# Patient Record
Sex: MALE | Race: WHITE | NOT HISPANIC OR LATINO | Employment: OTHER | URBAN - METROPOLITAN AREA
[De-identification: names, ages, dates, MRNs, and addresses within clinical notes are randomized per-mention and may not be internally consistent; named-entity substitution may affect disease eponyms.]

---

## 2017-07-19 ENCOUNTER — COMMUNICATION - HEALTHEAST (OUTPATIENT)
Dept: INTERNAL MEDICINE | Facility: CLINIC | Age: 62
End: 2017-07-19

## 2017-08-13 ENCOUNTER — AMBULATORY - HEALTHEAST (OUTPATIENT)
Dept: INTERNAL MEDICINE | Facility: CLINIC | Age: 62
End: 2017-08-13

## 2017-08-13 DIAGNOSIS — Z01.818 PREOPERATIVE EXAMINATION: ICD-10-CM

## 2017-08-14 ENCOUNTER — OFFICE VISIT - HEALTHEAST (OUTPATIENT)
Dept: INTERNAL MEDICINE | Facility: CLINIC | Age: 62
End: 2017-08-14

## 2017-08-14 DIAGNOSIS — G43.909 MIGRAINE HEADACHE: ICD-10-CM

## 2017-08-14 DIAGNOSIS — F34.1 DYSTHYMIC DISORDER: ICD-10-CM

## 2017-08-14 DIAGNOSIS — Z01.818 PREOPERATIVE EXAMINATION: ICD-10-CM

## 2017-08-14 DIAGNOSIS — E78.5 HYPERLIPIDEMIA: ICD-10-CM

## 2017-08-14 DIAGNOSIS — K21.00 REFLUX ESOPHAGITIS: ICD-10-CM

## 2017-08-14 DIAGNOSIS — K63.5 HYPERPLASTIC POLYP OF LARGE INTESTINE: ICD-10-CM

## 2017-08-14 ASSESSMENT — MIFFLIN-ST. JEOR: SCORE: 1708.65

## 2018-01-03 ENCOUNTER — OFFICE VISIT - HEALTHEAST (OUTPATIENT)
Dept: INTERNAL MEDICINE | Facility: CLINIC | Age: 63
End: 2018-01-03

## 2018-01-03 DIAGNOSIS — Z80.42 FAMILY HISTORY OF PROSTATE CANCER: ICD-10-CM

## 2018-01-03 DIAGNOSIS — R53.83 FATIGUE, UNSPECIFIED TYPE: ICD-10-CM

## 2018-01-03 DIAGNOSIS — Z00.00 HEALTH MAINTENANCE EXAMINATION: ICD-10-CM

## 2018-01-03 LAB
ALBUMIN SERPL-MCNC: 3.8 G/DL (ref 3.5–5)
ALP SERPL-CCNC: 99 U/L (ref 45–120)
ALT SERPL W P-5'-P-CCNC: 23 U/L (ref 0–45)
ANION GAP SERPL CALCULATED.3IONS-SCNC: 9 MMOL/L (ref 5–18)
AST SERPL W P-5'-P-CCNC: 22 U/L (ref 0–40)
BILIRUB SERPL-MCNC: 0.6 MG/DL (ref 0–1)
BUN SERPL-MCNC: 20 MG/DL (ref 8–22)
CALCIUM SERPL-MCNC: 9.7 MG/DL (ref 8.5–10.5)
CHLORIDE BLD-SCNC: 106 MMOL/L (ref 98–107)
CHOLEST SERPL-MCNC: 163 MG/DL
CO2 SERPL-SCNC: 25 MMOL/L (ref 22–31)
CREAT SERPL-MCNC: 0.79 MG/DL (ref 0.7–1.3)
ERYTHROCYTE [DISTWIDTH] IN BLOOD BY AUTOMATED COUNT: 11.9 % (ref 11–14.5)
FASTING STATUS PATIENT QL REPORTED: YES
GFR SERPL CREATININE-BSD FRML MDRD: >60 ML/MIN/1.73M2
GLUCOSE BLD-MCNC: 83 MG/DL (ref 70–125)
HCT VFR BLD AUTO: 43.2 % (ref 40–54)
HDLC SERPL-MCNC: 51 MG/DL
HGB BLD-MCNC: 14.5 G/DL (ref 14–18)
LDLC SERPL CALC-MCNC: 96 MG/DL
MCH RBC QN AUTO: 29.3 PG (ref 27–34)
MCHC RBC AUTO-ENTMCNC: 33.6 G/DL (ref 32–36)
MCV RBC AUTO: 87 FL (ref 80–100)
PLATELET # BLD AUTO: 237 THOU/UL (ref 140–440)
PMV BLD AUTO: 8.1 FL (ref 7–10)
POTASSIUM BLD-SCNC: 4.7 MMOL/L (ref 3.5–5)
PROT SERPL-MCNC: 6.9 G/DL (ref 6–8)
PSA SERPL-MCNC: 0.9 NG/ML (ref 0–4.5)
RBC # BLD AUTO: 4.96 MILL/UL (ref 4.4–6.2)
SODIUM SERPL-SCNC: 140 MMOL/L (ref 136–145)
TRIGL SERPL-MCNC: 78 MG/DL
TSH SERPL DL<=0.005 MIU/L-ACNC: 0.9 UIU/ML (ref 0.3–5)
WBC: 7 THOU/UL (ref 4–11)

## 2018-01-03 ASSESSMENT — MIFFLIN-ST. JEOR: SCORE: 1738.31

## 2018-05-08 ENCOUNTER — RECORDS - HEALTHEAST (OUTPATIENT)
Dept: ADMINISTRATIVE | Facility: OTHER | Age: 63
End: 2018-05-08

## 2018-10-19 ENCOUNTER — OFFICE VISIT - HEALTHEAST (OUTPATIENT)
Dept: INTERNAL MEDICINE | Facility: CLINIC | Age: 63
End: 2018-10-19

## 2018-10-19 DIAGNOSIS — G43.809 OTHER MIGRAINE WITHOUT STATUS MIGRAINOSUS, NOT INTRACTABLE: ICD-10-CM

## 2018-10-19 DIAGNOSIS — I73.00 RAYNAUD'S DISEASE WITHOUT GANGRENE: ICD-10-CM

## 2018-10-19 ASSESSMENT — MIFFLIN-ST. JEOR: SCORE: 1647.98

## 2018-10-25 ENCOUNTER — COMMUNICATION - HEALTHEAST (OUTPATIENT)
Dept: INTERNAL MEDICINE | Facility: CLINIC | Age: 63
End: 2018-10-25

## 2018-10-25 DIAGNOSIS — G43.809 OTHER MIGRAINE WITHOUT STATUS MIGRAINOSUS, NOT INTRACTABLE: ICD-10-CM

## 2018-10-28 ENCOUNTER — COMMUNICATION - HEALTHEAST (OUTPATIENT)
Dept: INTERNAL MEDICINE | Facility: CLINIC | Age: 63
End: 2018-10-28

## 2018-10-29 ENCOUNTER — AMBULATORY - HEALTHEAST (OUTPATIENT)
Dept: INTERNAL MEDICINE | Facility: CLINIC | Age: 63
End: 2018-10-29

## 2018-11-05 ENCOUNTER — COMMUNICATION - HEALTHEAST (OUTPATIENT)
Dept: INTERNAL MEDICINE | Facility: CLINIC | Age: 63
End: 2018-11-05

## 2018-11-05 DIAGNOSIS — Z79.899 REFERRED FOR MANAGEMENT OF MEDICATION THERAPY: ICD-10-CM

## 2018-11-05 DIAGNOSIS — G43.909 MIGRAINE HEADACHE: ICD-10-CM

## 2019-01-11 ENCOUNTER — OFFICE VISIT - HEALTHEAST (OUTPATIENT)
Dept: INTERNAL MEDICINE | Facility: CLINIC | Age: 64
End: 2019-01-11

## 2019-01-11 DIAGNOSIS — Z00.00 HEALTH MAINTENANCE EXAMINATION: ICD-10-CM

## 2019-01-11 DIAGNOSIS — Z79.899 CONTROLLED SUBSTANCE AGREEMENT SIGNED: ICD-10-CM

## 2019-01-11 DIAGNOSIS — Z12.5 PROSTATE CANCER SCREENING: ICD-10-CM

## 2019-01-11 DIAGNOSIS — R10.9 FLANK PAIN: ICD-10-CM

## 2019-01-11 DIAGNOSIS — Z23 NEED FOR SHINGLES VACCINE: ICD-10-CM

## 2019-01-11 LAB
ALBUMIN SERPL-MCNC: 4.1 G/DL (ref 3.5–5)
ALP SERPL-CCNC: 85 U/L (ref 45–120)
ALT SERPL W P-5'-P-CCNC: 27 U/L (ref 0–45)
AMPHETAMINES UR QL SCN: NORMAL
ANION GAP SERPL CALCULATED.3IONS-SCNC: 11 MMOL/L (ref 5–18)
AST SERPL W P-5'-P-CCNC: 26 U/L (ref 0–40)
BARBITURATES UR QL: NORMAL
BENZODIAZ UR QL: NORMAL
BILIRUB DIRECT SERPL-MCNC: 0.2 MG/DL
BILIRUB SERPL-MCNC: 0.8 MG/DL (ref 0–1)
BUN SERPL-MCNC: 17 MG/DL (ref 8–22)
CALCIUM SERPL-MCNC: 9.7 MG/DL (ref 8.5–10.5)
CANNABINOIDS UR QL SCN: NORMAL
CHLORIDE BLD-SCNC: 105 MMOL/L (ref 98–107)
CHOLEST SERPL-MCNC: 172 MG/DL
CO2 SERPL-SCNC: 23 MMOL/L (ref 22–31)
COCAINE UR QL: NORMAL
CREAT SERPL-MCNC: 0.82 MG/DL (ref 0.7–1.3)
CREAT UR-MCNC: 82.5 MG/DL
ERYTHROCYTE [DISTWIDTH] IN BLOOD BY AUTOMATED COUNT: 11 % (ref 11–14.5)
FASTING STATUS PATIENT QL REPORTED: YES
GFR SERPL CREATININE-BSD FRML MDRD: >60 ML/MIN/1.73M2
GLUCOSE BLD-MCNC: 88 MG/DL (ref 70–125)
HCT VFR BLD AUTO: 43.7 % (ref 40–54)
HDLC SERPL-MCNC: 50 MG/DL
HGB BLD-MCNC: 14.6 G/DL (ref 14–18)
LDLC SERPL CALC-MCNC: 106 MG/DL
MCH RBC QN AUTO: 29.2 PG (ref 27–34)
MCHC RBC AUTO-ENTMCNC: 33.4 G/DL (ref 32–36)
MCV RBC AUTO: 87 FL (ref 80–100)
OPIATES UR QL SCN: NORMAL
OXYCODONE UR QL: NORMAL
PCP UR QL SCN: NORMAL
PLATELET # BLD AUTO: 217 THOU/UL (ref 140–440)
PMV BLD AUTO: 8.7 FL (ref 7–10)
POTASSIUM BLD-SCNC: 4.5 MMOL/L (ref 3.5–5)
PROT SERPL-MCNC: 6.9 G/DL (ref 6–8)
PSA SERPL-MCNC: 0.8 NG/ML (ref 0–4.5)
RBC # BLD AUTO: 5 MILL/UL (ref 4.4–6.2)
SODIUM SERPL-SCNC: 139 MMOL/L (ref 136–145)
TRIGL SERPL-MCNC: 80 MG/DL
WBC: 6.5 THOU/UL (ref 4–11)

## 2019-01-11 ASSESSMENT — MIFFLIN-ST. JEOR: SCORE: 1654.15

## 2019-01-14 ENCOUNTER — HOSPITAL ENCOUNTER (OUTPATIENT)
Dept: CT IMAGING | Facility: HOSPITAL | Age: 64
Discharge: HOME | End: 2019-01-14
Attending: INTERNAL MEDICINE

## 2019-01-14 ENCOUNTER — COMMUNICATION - HEALTHEAST (OUTPATIENT)
Dept: INTERNAL MEDICINE | Facility: CLINIC | Age: 64
End: 2019-01-14

## 2019-01-14 DIAGNOSIS — R10.9 FLANK PAIN: ICD-10-CM

## 2019-01-16 ENCOUNTER — AMBULATORY - HEALTHEAST (OUTPATIENT)
Dept: INTERNAL MEDICINE | Facility: CLINIC | Age: 64
End: 2019-01-16

## 2019-01-16 DIAGNOSIS — N28.1 ACQUIRED RENAL CYST OF RIGHT KIDNEY: ICD-10-CM

## 2019-01-17 ENCOUNTER — HOSPITAL ENCOUNTER (OUTPATIENT)
Dept: ULTRASOUND IMAGING | Facility: HOSPITAL | Age: 64
Discharge: HOME | End: 2019-01-17
Attending: INTERNAL MEDICINE

## 2019-01-17 DIAGNOSIS — N28.1 ACQUIRED RENAL CYST OF RIGHT KIDNEY: ICD-10-CM

## 2019-03-12 ENCOUNTER — RECORDS - HEALTHEAST (OUTPATIENT)
Dept: ADMINISTRATIVE | Facility: OTHER | Age: 64
End: 2019-03-12

## 2019-03-22 ENCOUNTER — COMMUNICATION - HEALTHEAST (OUTPATIENT)
Dept: INTERNAL MEDICINE | Facility: CLINIC | Age: 64
End: 2019-03-22

## 2019-04-12 ENCOUNTER — AMBULATORY - HEALTHEAST (OUTPATIENT)
Dept: NURSING | Facility: CLINIC | Age: 64
End: 2019-04-12

## 2019-04-12 DIAGNOSIS — Z23 NEED FOR SHINGLES VACCINE: ICD-10-CM

## 2019-09-13 ENCOUNTER — RECORDS - HEALTHEAST (OUTPATIENT)
Dept: ADMINISTRATIVE | Facility: OTHER | Age: 64
End: 2019-09-13

## 2019-09-15 ENCOUNTER — COMMUNICATION - HEALTHEAST (OUTPATIENT)
Dept: INTERNAL MEDICINE | Facility: CLINIC | Age: 64
End: 2019-09-15

## 2019-09-15 DIAGNOSIS — G43.809 OTHER MIGRAINE WITHOUT STATUS MIGRAINOSUS, NOT INTRACTABLE: ICD-10-CM

## 2019-09-15 DIAGNOSIS — E78.5 HYPERLIPIDEMIA, UNSPECIFIED HYPERLIPIDEMIA TYPE: ICD-10-CM

## 2019-09-17 RX ORDER — ACETAMINOPHEN AND CODEINE PHOSPHATE 300; 30 MG/1; MG/1
1-2 TABLET ORAL EVERY 4 HOURS PRN
Qty: 60 TABLET | Refills: 0 | Status: SHIPPED | OUTPATIENT
Start: 2019-09-17

## 2019-09-17 RX ORDER — PRAVASTATIN SODIUM 80 MG/1
80 TABLET ORAL AT BEDTIME
Qty: 90 TABLET | Refills: 3 | Status: SHIPPED | OUTPATIENT
Start: 2019-09-17

## 2019-09-20 ENCOUNTER — COMMUNICATION - HEALTHEAST (OUTPATIENT)
Dept: INTERNAL MEDICINE | Facility: CLINIC | Age: 64
End: 2019-09-20

## 2019-12-12 ENCOUNTER — RECORDS - HEALTHEAST (OUTPATIENT)
Dept: ADMINISTRATIVE | Facility: OTHER | Age: 64
End: 2019-12-12

## 2019-12-18 ENCOUNTER — OFFICE VISIT - HEALTHEAST (OUTPATIENT)
Dept: INTERNAL MEDICINE | Facility: CLINIC | Age: 64
End: 2019-12-18

## 2019-12-18 DIAGNOSIS — M77.9 BONE SPUR: ICD-10-CM

## 2019-12-18 DIAGNOSIS — Z01.818 PREOP EXAM FOR INTERNAL MEDICINE: ICD-10-CM

## 2019-12-18 LAB
ATRIAL RATE - MUSE: 70 BPM
DIASTOLIC BLOOD PRESSURE - MUSE: NORMAL
INTERPRETATION ECG - MUSE: NORMAL
P AXIS - MUSE: 42 DEGREES
PR INTERVAL - MUSE: 168 MS
QRS DURATION - MUSE: 80 MS
QT - MUSE: 382 MS
QTC - MUSE: 412 MS
R AXIS - MUSE: 24 DEGREES
SYSTOLIC BLOOD PRESSURE - MUSE: NORMAL
T AXIS - MUSE: 41 DEGREES
VENTRICULAR RATE- MUSE: 70 BPM

## 2019-12-18 ASSESSMENT — MIFFLIN-ST. JEOR: SCORE: 1714.61

## 2019-12-19 ENCOUNTER — RECORDS - HEALTHEAST (OUTPATIENT)
Dept: ADMINISTRATIVE | Facility: OTHER | Age: 64
End: 2019-12-19

## 2020-01-02 ENCOUNTER — RECORDS - HEALTHEAST (OUTPATIENT)
Dept: ADMINISTRATIVE | Facility: OTHER | Age: 65
End: 2020-01-02

## 2020-04-29 ENCOUNTER — RECORDS - HEALTHEAST (OUTPATIENT)
Dept: ADMINISTRATIVE | Facility: OTHER | Age: 65
End: 2020-04-29

## 2020-07-06 ENCOUNTER — OFFICE VISIT - HEALTHEAST (OUTPATIENT)
Dept: INTERNAL MEDICINE | Facility: CLINIC | Age: 65
End: 2020-07-06

## 2020-07-06 DIAGNOSIS — G47.00 INSOMNIA, UNSPECIFIED TYPE: ICD-10-CM

## 2020-07-06 DIAGNOSIS — Z12.5 PROSTATE CANCER SCREENING: ICD-10-CM

## 2020-07-06 DIAGNOSIS — E78.5 HYPERLIPIDEMIA, UNSPECIFIED HYPERLIPIDEMIA TYPE: ICD-10-CM

## 2020-07-06 DIAGNOSIS — Z80.42 FAMILY HISTORY OF PROSTATE CANCER: ICD-10-CM

## 2020-07-06 DIAGNOSIS — Z12.11 COLON CANCER SCREENING: ICD-10-CM

## 2020-07-06 DIAGNOSIS — Z00.00 HEALTH MAINTENANCE EXAMINATION: ICD-10-CM

## 2020-07-06 LAB
ALBUMIN SERPL-MCNC: 3.9 G/DL (ref 3.5–5)
ALP SERPL-CCNC: 105 U/L (ref 45–120)
ALT SERPL W P-5'-P-CCNC: 26 U/L (ref 0–45)
ANION GAP SERPL CALCULATED.3IONS-SCNC: 10 MMOL/L (ref 5–18)
AST SERPL W P-5'-P-CCNC: 22 U/L (ref 0–40)
BILIRUB DIRECT SERPL-MCNC: 0.2 MG/DL
BILIRUB SERPL-MCNC: 0.5 MG/DL (ref 0–1)
BUN SERPL-MCNC: 14 MG/DL (ref 8–22)
CALCIUM SERPL-MCNC: 10.2 MG/DL (ref 8.5–10.5)
CHLORIDE BLD-SCNC: 105 MMOL/L (ref 98–107)
CHOLEST SERPL-MCNC: 173 MG/DL
CO2 SERPL-SCNC: 25 MMOL/L (ref 22–31)
CREAT SERPL-MCNC: 0.9 MG/DL (ref 0.7–1.3)
ERYTHROCYTE [DISTWIDTH] IN BLOOD BY AUTOMATED COUNT: 12 % (ref 11–14.5)
FASTING STATUS PATIENT QL REPORTED: YES
GFR SERPL CREATININE-BSD FRML MDRD: >60 ML/MIN/1.73M2
GLUCOSE BLD-MCNC: 88 MG/DL (ref 70–125)
HCT VFR BLD AUTO: 44.9 % (ref 40–54)
HDLC SERPL-MCNC: 46 MG/DL
HGB BLD-MCNC: 15.3 G/DL (ref 14–18)
LDLC SERPL CALC-MCNC: 103 MG/DL
MCH RBC QN AUTO: 30.1 PG (ref 27–34)
MCHC RBC AUTO-ENTMCNC: 34 G/DL (ref 32–36)
MCV RBC AUTO: 88 FL (ref 80–100)
PLATELET # BLD AUTO: 220 THOU/UL (ref 140–440)
PMV BLD AUTO: 8.5 FL (ref 7–10)
POTASSIUM BLD-SCNC: 4.3 MMOL/L (ref 3.5–5)
PROT SERPL-MCNC: 6.8 G/DL (ref 6–8)
PSA SERPL-MCNC: 0.9 NG/ML (ref 0–4.5)
RBC # BLD AUTO: 5.08 MILL/UL (ref 4.4–6.2)
SODIUM SERPL-SCNC: 140 MMOL/L (ref 136–145)
TRIGL SERPL-MCNC: 120 MG/DL
WBC: 6.8 THOU/UL (ref 4–11)

## 2020-07-06 RX ORDER — TEMAZEPAM 15 MG/1
15-30 CAPSULE ORAL
Qty: 60 CAPSULE | Refills: 0 | Status: SHIPPED | OUTPATIENT
Start: 2020-07-06

## 2020-07-06 ASSESSMENT — MIFFLIN-ST. JEOR: SCORE: 1730.19

## 2020-07-07 ASSESSMENT — PATIENT HEALTH QUESTIONNAIRE - PHQ9: SUM OF ALL RESPONSES TO PHQ QUESTIONS 1-9: 3

## 2020-08-03 ENCOUNTER — RECORDS - HEALTHEAST (OUTPATIENT)
Dept: ADMINISTRATIVE | Facility: OTHER | Age: 65
End: 2020-08-03

## 2020-12-02 ENCOUNTER — RECORDS - HEALTHEAST (OUTPATIENT)
Dept: ADMINISTRATIVE | Facility: OTHER | Age: 65
End: 2020-12-02

## 2021-05-18 ENCOUNTER — RECORDS - HEALTHEAST (OUTPATIENT)
Dept: ADMINISTRATIVE | Facility: OTHER | Age: 66
End: 2021-05-18

## 2021-05-22 ENCOUNTER — HEALTH MAINTENANCE LETTER (OUTPATIENT)
Age: 66
End: 2021-05-22

## 2021-05-26 ENCOUNTER — OFFICE VISIT (OUTPATIENT)
Dept: NEUROLOGY | Facility: CLINIC | Age: 66
End: 2021-05-26
Payer: COMMERCIAL

## 2021-05-26 DIAGNOSIS — M79.622 PAIN OF LEFT UPPER ARM: Primary | ICD-10-CM

## 2021-05-26 PROCEDURE — 95886 MUSC TEST DONE W/N TEST COMP: CPT | Performed by: PSYCHIATRY & NEUROLOGY

## 2021-05-26 PROCEDURE — 99207 PR NO CHARGE NURSE ONLY: CPT | Performed by: PSYCHIATRY & NEUROLOGY

## 2021-05-26 PROCEDURE — 95910 NRV CNDJ TEST 7-8 STUDIES: CPT | Performed by: PSYCHIATRY & NEUROLOGY

## 2021-05-26 NOTE — PROCEDURES
[facility]     Formerly [facility]  [address] [place] [address], [address]  [place] [address]  Tel: [phone]  Fax: [phone]          Full Name: Leonidas Crum Gender: Male  Patient ID: [id number] YOB: 1955      Visit Date: 5/26/2021 10:52  Age: 65 Years 11 Months Old  Interpreted By: Chavo Mcintosh M.D.   Ref Dr.: Calvin Land MD  Tech:    Height: 6 feet 0 inch  Reason for referral: Evaluate left upper. c/o numbness, pain in left hand/fingers, mainly in digits I-III for 3 weeks.       Motor NCS      Nerve / Sites Lat Amp Dist Edward    ms mV cm m/s   L Median - APB      Wrist 3.49 14.1 7       Elbow 8.75 13.8 26.5 50   L Ulnar - ADM      Wrist 3.02 10.6 7       B.Elbow 7.19 10.3 23 55      A.Elbow 9.58 9.4 10 42       F  Wave      Nerve Fmin    ms   L Ulnar - ADM 31.41       Sensory NCS      Nerve / Sites Pk Lat Amp.1-2 Dist Lat Diff    ms  V cm ms   L Median - II (Antidr)      Wrist 2.97 20.3 13    L Ulnar - V (Antidr)      Wrist 3.23 7.5 11    L Median, Ulnar - Transcarpal comparison      Median Palm 2.29 42.4 8       Ulnar Palm 1.93 32.8 8        0.36       EMG Summary Table     Spontaneous MUAP Rcmt Note   Muscle Fib PSW Fasc IA # Amp Dur PPP Rate Type   L. Brachioradialis None None None N N N N N N N   L. Pronator teres None None None N N N N N N N   L. Biceps brachii None None None N N N N N N N   L. Deltoid None None None N N N N N N N   L. Triceps brachii None None None N N N N N N N   L. Extensor digitorum communis None None None N N N N N N N   L. Flexor carpi ulnaris None None None N N N N N N N   L. Abductor digiti minimi (manus) None None None N N N N N N N   L. First dorsal interosseous None None None N N N N N N N   L. Abductor pollicis brevis None None None N N N N N N N       EMG of the left upper extremity    Left median motor conduction study is normal  Left ulnar motor conduction study is normal  Left ulnar F-wave latency  is normal    Left median snap potential normal  Left ulnar snap potential normal  Left median palmar latency normal  Left ulnar palmar latency normal      Needle examination of the left upper extremity shows no evidence of any active or chronic denervation    Impression  Normal EMG of the left upper extremity

## 2021-05-26 NOTE — LETTER
5/26/2021         RE: Leonidas Crum  [address] [address] [facility] [id number]        Dear Colleague,    Thank you for referring your patient, Leonidas Crum, to the [facility] [place]. Please see a copy of my visit note below.    No notes on file    Again, thank you for allowing me to participate in the care of your patient.        Sincerely,        Chavo Mcintosh MD

## 2021-05-26 NOTE — TELEPHONE ENCOUNTER
New Appointment Needed  What is the reason for the visit:    2nd shingles vaccine  Provider Preference: nurse  How soon do you need to be seen?: whenever vaccine is available  Waitlist offered?: No  Okay to leave a detailed message:  Yes

## 2021-05-27 ASSESSMENT — PATIENT HEALTH QUESTIONNAIRE - PHQ9: SUM OF ALL RESPONSES TO PHQ QUESTIONS 1-9: 3

## 2021-05-31 ENCOUNTER — RECORDS - HEALTHEAST (OUTPATIENT)
Dept: ADMINISTRATIVE | Facility: CLINIC | Age: 66
End: 2021-05-31

## 2021-05-31 VITALS — BODY MASS INDEX: 26.68 KG/M2 | WEIGHT: 197 LBS | HEIGHT: 72 IN

## 2021-05-31 VITALS — HEIGHT: 72 IN | WEIGHT: 203 LBS | BODY MASS INDEX: 27.5 KG/M2

## 2021-06-01 ENCOUNTER — RECORDS - HEALTHEAST (OUTPATIENT)
Dept: ADMINISTRATIVE | Facility: CLINIC | Age: 66
End: 2021-06-01

## 2021-06-01 NOTE — TELEPHONE ENCOUNTER
Medication Request  Medication name: Temazepam 15 mg   Pharmacy Name and Location: [facility])  Reason for request: Request received from patient's pharmacy via fax.   When did you use medication last?:  Information not provided.   Patient offered appointment:  N/a  Okay to leave a detailed message: no

## 2021-06-01 NOTE — TELEPHONE ENCOUNTER
Medication: Triazolam, Tylenol #3  Last Date Filled Unknown- blank   pulled:  blank-no controlled substances have been filled in the past year    Only PCP Prescribing? : unknown  Taken as prescribed from physician notes? YES    4. CSA updated for tylenol #3-- not taking as much any longer due to less migraines with Aimovig     CSA in last year: YES  Random Utox in last year: YES  (if any of the above answer NO - schedule with PCP)     Opioids + benzodiazepines? YES  (if the above answer YES - schedule with PCP every 6 months)     Is patient on the Executive Review Team? No      All responses suggest: Routing to PCp to advise

## 2021-06-01 NOTE — TELEPHONE ENCOUNTER
It looks like Dr. Ledezma has prescribed this >5 years ago.  Would like to discuss at a visit first

## 2021-06-01 NOTE — TELEPHONE ENCOUNTER
Called pt and relayed PCP message. Pt is going to wait til annual physical in January to discuss med management.

## 2021-06-02 ENCOUNTER — RECORDS - HEALTHEAST (OUTPATIENT)
Dept: ADMINISTRATIVE | Facility: CLINIC | Age: 66
End: 2021-06-02

## 2021-06-02 VITALS — WEIGHT: 185 LBS | BODY MASS INDEX: 25.9 KG/M2 | HEIGHT: 71 IN

## 2021-06-02 VITALS — WEIGHT: 183.1 LBS | BODY MASS INDEX: 24.8 KG/M2 | HEIGHT: 72 IN

## 2021-06-04 ENCOUNTER — RECORDS - HEALTHEAST (OUTPATIENT)
Dept: ADMINISTRATIVE | Facility: OTHER | Age: 66
End: 2021-06-04

## 2021-06-04 VITALS
HEIGHT: 72 IN | HEART RATE: 66 BPM | BODY MASS INDEX: 27.09 KG/M2 | DIASTOLIC BLOOD PRESSURE: 82 MMHG | WEIGHT: 200 LBS | SYSTOLIC BLOOD PRESSURE: 114 MMHG | OXYGEN SATURATION: 98 %

## 2021-06-04 VITALS
BODY MASS INDEX: 28.01 KG/M2 | DIASTOLIC BLOOD PRESSURE: 80 MMHG | WEIGHT: 200.06 LBS | HEART RATE: 74 BPM | SYSTOLIC BLOOD PRESSURE: 122 MMHG | OXYGEN SATURATION: 99 % | HEIGHT: 71 IN

## 2021-06-09 NOTE — PROGRESS NOTES
[facility] Note    Leonidas Crum   65 y.o. male    Date of Visit: 7/6/2020  Chief Complaint   Patient presents with     Annual Exam     had 1 bite of a scone this morning       ASSESSMENT/PLAN  1. Health maintenance examination  Lipid Cascade    Basic Metabolic Panel    HM2(CBC w/o Differential)    Hepatic Profile   2. Hyperlipidemia, unspecified hyperlipidemia type     3. Family history of prostate cancer  PSA (Prostatic-Specific Antigen), Annual Screen   4. Prostate cancer screening  PSA (Prostatic-Specific Antigen), Annual Screen   5. Insomnia, unspecified type  temazepam (RESTORIL) 15 mg capsule   6. Colon cancer screening  Ambulatory referral for Colonoscopy     ---------------------------------------------  1.  Overall healthy, doing well.  Check fasting labs today.  He did have a bite from a scone, but I do not think that this would significantly alter his lab work    2.  Check cholesterol    3-4.  Due to family history, continue annual PSA and rectal exam, the latter by his preference.  He is developing some mild lower urinary tract symptoms, has history of TURP.    5.  Refilled temazepam instead of triazolam given cost    6.  Refer for colon cancer screening, can be done before it is due in December, according to his insurance    Return in about 1 year (around 7/6/2021) for Annual physical, Establish care with new provider.      JAIR Horner is a 65-year-old man who presents for annual physical.  Last time I saw him was last December prior to his right elbow bone spur surgery.    The date of the last physical was January 11, 2019, delayed because of coronavirus pandemic.    He has a family history of prostate cancer, routinely gets PSA and DAVID yearly.  He has some dripping at the end of the urinary stream, wakes up once at night.  It is a minor nuisance.  He is not interested in trying medications.  It was noted that he had a TURP in the past, and was told by a urologist that he  has developed some scar tissue in the urethra.    He has seen a dermatologist, has not developed any new skin lesions.  He has a flesh tone papule on the right knee which is longstanding and has not changed.    He is on CSA for Tylenol 3, but not taking it as much due to better migraine prevention with prophylactic injection.    He has taken triazolam, Lunesta, and temazepam at different times for insomnia, but does not like any of them, because while they let him sleep that night, he cannot sleep the next night.  He does not take them very often at all.  Although he is not taking Lunesta very regularly, he would like it kept on the list for ease of refills.  Triazolam was not the formulary preferred medication, so he wanted to switch back to temazepam, which we have done today.    He will be due for 10-year colon cancer screening.    He lives in [place], he is considering going back to [facility].  He originally followed Dr. Evaristo Ledezma from [facility] to [facility], which is now [place].    He is walking for exercise    ROS A comprehensive review of systems was performed and was otherwise negative    Medications, allergies, and problem list were reviewed and updated    Patient Active Problem List   Diagnosis     Allergic Rhinitis     Hyperlipidemia     Chronic Reflux Esophagitis     Anxiety     Dysthymic Disorder     Migraine Headache     Raynaud's disease without gangrene     Insomnia     Family history of prostate cancer     Acquired renal cyst of right kidney     Past Medical History:   Diagnosis Date     Fatigue     Created by Conversion      Histoplasmosis      Hyperplastic polyp of large intestine 8/14/2017     Migraine      Nephrolithiasis     Created by Conversion      Raynaud's Phenomenon     Created by Conversion      Past Surgical History:   Procedure Laterality Date     HALLUX RIGIDUS       [place] APPENDECTOMY      Description: Appendectomy;  Recorded: 06/11/2008;     [facility] FLU[facility]  GDNCE DX W/CELL WASHG SPX      Description: Lung Surgery Bronchoscopy;  Recorded: 2008;     [place] CYSTOSCOPY,MANIPULATN      Description: Cystoscopy With Manipulation Of Ureteral Calculus;  Recorded: 2008;     [place] CYSTOURETHROSCOPY      Description: Cystoscopy (For Therapy);  Recorded: 2011;  Comments: Ureteric stone, lithotripsy.     [place] KNEE SCOPE,DIAGNOSTIC      Description: Arthroscopy Knee Left;  Recorded: 2008;     [place] KNEE SCOPE,DIAGNOSTIC      Description: Arthroscopy Knee Right;  Recorded: 2011;     [place] MEDIASTINOSCOPY      Description: Mediastinoscopy;  Recorded: 2008;     [place] TRANSURETHRAL ELEC-SURG PROSTATECTOM      Description: Transurethral Resection Of Prostate (TURP);  Recorded: 2009;  Comments: LASER SURGERY     Social History     Socioeconomic History     Marital status:      Spouse name: Not on file     Number of children: Not on file     Years of education: Not on file     Highest education level: Not on file   Occupational History     Not on file   Social Needs     Financial resource strain: Not on file     Food insecurity     Worry: Not on file     Inability: Not on file     Transportation needs     Medical: Not on file     Non-medical: Not on file   Tobacco Use     Smoking status: Former Smoker     Last attempt to quit: 1985     Years since quittin.8     Smokeless tobacco: Never Used   Substance and Sexual Activity     Alcohol use: Yes     Comment: 1/wk     Drug use: Not on file     Sexual activity: Not on file   Lifestyle     Physical activity     Days per week: Not on file     Minutes per session: Not on file     Stress: Not on file   Relationships     Social connections     Talks on phone: Not on file     Gets together: Not on file     Attends Muslim service: Not on file     Active member of club or organization: Not on file     Attends meetings of clubs or organizations: Not on file     Relationship status: Not on file     Intimate  partner violence     Fear of current or ex partner: Not on file     Emotionally abused: Not on file     Physically abused: Not on file     Forced sexual activity: Not on file   Other Topics Concern     Not on file   Social History Narrative     Not on file     Family History   Problem Relation Age of Onset     Cancer Father         KIDNEY     Hypertension Mother      Cancer Brother         PROSTATE       Current Outpatient Medications   Medication Sig Dispense Refill     acetaminophen-codeine (TYLENOL #3) 300-30 mg per tablet Take 1-2 tablets by mouth every 4 (four) hours as needed for pain. 60 tablet 0     clotrimazole (LOTRIMIN) 1 % cream Apply topically 2 (two) times a day.       eszopiclone (LUNESTA) tablet Take 3 mg by mouth at bedtime as needed. Take immediately before bedtime              fluocinonide (LIDEX) 0.05 % cream Apply topically 2 (two) times a day as needed.              galcanezumab-gnlm 120 mg/mL PnIj ONE PEN injected SC once a month for headache prevention       mometasone (NASONEX) 50 mcg/actuation nasal spray 2 sprays into each nostril daily as needed.              pravastatin (PRAVACHOL) 80 MG tablet Take 1 tablet (80 mg total) by mouth at bedtime. 90 tablet 3     temazepam (RESTORIL) 15 mg capsule Take 1-2 capsules (15-30 mg total) by mouth at bedtime as needed for sleep. 60 capsule 0     No current facility-administered medications for this visit.        No Known Allergies    EXAM  Vitals:    07/06/20 0845   BP: 114/82   Patient Site: Left Arm   Patient Position: Sitting   Cuff Size: Adult Regular   Pulse: 66   SpO2: 98%   Weight: 200 lb (90.7 kg)   Height: 6' (1.829 m)         General: alert, no distress  HEENT: sclerae anicteric, moist oral mucosa  Heart: Regular rate and rhythm, no murmurs.  No pretibial edema.  Warm extremities  Lungs: Clear to auscultation bilat  Gastrointestinal: abdomen is soft, non-tender, non-distended.    Skin: warm/dry, no rashes, flesh tone papule measuring about  4 mm near her right knee, consistent with scar tissue.  There are multiple seborrheic keratoses on the back.  There are benign-appearing solar lentigines on the forehead.  Neuro: no gross abnormalities  Genitourinary: DAVID- prostate normal size/contour, no nodules      Edgar Brice, DO  Internal Medicine  [facility]

## 2021-06-12 NOTE — PROGRESS NOTES
Preoperative Consultation  Assessment and Plan:  The patient is an acceptable operative candidate for the procedure planned.  He should hold the following medications:  None    He should take the following medications on the day of surgery: This is on long-term beta blockers he should take his beta-blocker on the day of surgery.  Although propranolol is actually for his migraine headaches.    1. Preoperative examination     2. Migraine headache  propranolol (INDERAL LA) 60 mg 24 hr capsule   3. Dysthymic disorder     4. Chronic Reflux Esophagitis     5. Hyperlipidemia         This note has been dictated using voice recognition software. Any grammatical or context distortions are unintentional and inherent to the software.     Evaristo Ledezma MD  Internal Medicine & Travel Medicine  [place] [id number]  Voice: [phone]  Fax: [phone]      Leonidas Pinabharathi   62 y.o.  male    Date of visit: 8/14/2017    This is a preoperative consultation requested by Dr. Bartholomew in preparation for lens replacement  on August 16, 2017 at [facility].  Fax .    Patient Profile:   Social History     Social History Narrative       Patient Active Problem List   Diagnosis     Allergic Rhinitis     Hyperlipidemia     Chronic Reflux Esophagitis     Anxiety     Dysthymic Disorder     Migraine Headache     Insomnia     Hyperplastic Polyp Of The Large Intestine       HPI:   He has had previous Lasik surgery and is having problems with worsening vision so now he will undergo a lens replacement on the right eye that he does not have to wear corrective lenses.  She has had multiple surgeries before with no problems.  He has no history of anesthesia or bleeding problems.  He has no personal history of thromboembolic issues.  There is no family history of anesthesia or bleeding problems.    He has chronic insomnia but is largely stop using sleeping pills  as a tended to lose their effectiveness over time.  He has mild symptoms of obstructive BPH.  His chronic episodic migraine headaches which are under reasonably good control and he requests a refill of his Imitrex.    Unrelated to his preop he is concerned about colon cancer screening and since he has a prior history of hyperplastic polyps, that is the same as no polyps at all.  He has no first-degree relatives with colon cancer and so he is not due for colon cancer screening until December 2020.    Review of systems:  A comprehensive review of systems was performed and was otherwise negative    Current Outpatient Prescriptions on File Prior to Visit   Medication Sig     clotrimazole (LOTRIMIN) 1 % cream Apply topically 2 (two) times a day.     eszopiclone (LUNESTA) tablet Take 3 mg by mouth bedtime. Take immediately before bedtime     fluocinonide (LIDEX) 0.05 % cream Apply topically 2 (two) times a day.     mometasone (NASONEX) 50 mcg/actuation nasal spray 2 sprays into each nostril daily.     tamsulosin (FLOMAX) 0.4 mg Cp24 Take 1 capsule (0.4 mg total) by mouth daily.     triazolam (HALCION) 0.25 MG tablet Take 0.25 mg by mouth bedtime as needed.     [DISCONTINUED] acetaminophen-codeine (TYLENOL #3) 300-30 mg per tablet Take 1-2 tablets by mouth every 4 (four) hours as needed for pain.     [DISCONTINUED] pravastatin (PRAVACHOL) 80 MG tablet Take 1 tablet (80 mg total) by mouth bedtime.     [DISCONTINUED] propranolol (INDERAL LA) 60 mg 24 hr capsule 1 tab twice daily     [DISCONTINUED] SUMAtriptan (IMITREX) 6 mg/0.5 mL Soln Inject 6 mg under the skin once.     No current facility-administered medications on file prior to visit.        No Known Allergies      Medications, allergies, and problem list were reviewed and updated    Past Medical History:   Diagnosis Date     Fatigue     Created by Conversion      Histoplasmosis      Migraine      Nephrolithiasis     Created by Conversion      Raynaud's Phenomenon      Created by Conversion      Past Surgical History:   Procedure Laterality Date     HALLUX RIGIDUS       [place] APPENDECTOMY      Description: Appendectomy;  Recorded: 06/11/2008;     [place] BRONCHOSCOPY,DIAGNOSTIC      Description: Lung Surgery Bronchoscopy;  Recorded: 03/19/2008;     [place] CYSTOSCOPY,MANIPULATN      Description: Cystoscopy With Manipulation Of Ureteral Calculus;  Recorded: 12/26/2008;     [place] CYSTOURETHROSCOPY      Description: Cystoscopy (For Therapy);  Recorded: 02/28/2011;  Comments: Ureteric stone, lithotripsy.     [place] KNEE SCOPE,DIAGNOSTIC      Description: Arthroscopy Knee Left;  Recorded: 12/26/2008;     [place] KNEE SCOPE,DIAGNOSTIC      Description: Arthroscopy Knee Right;  Recorded: 04/12/2011;     [place] MEDIASTINOSCOPY      Description: Mediastinoscopy;  Recorded: 03/19/2008;     [place] TRANSURETHRAL ELEC-SURG PROSTATECTOM      Description: Transurethral Resection Of Prostate (TURP);  Recorded: 11/06/2009;  Comments: LASER SURGERY     Social History     Social History     Marital status:      Spouse name: N/A     Number of children: N/A     Years of education: N/A     Occupational History     Not on file.     Social History Main Topics     Smoking status: Former Smoker     Quit date: 9/21/1985     Smokeless tobacco: Not on file     Alcohol use Not on file     Drug use: Not on file     Sexual activity: Not on file     Other Topics Concern     Not on file     Social History Narrative     Family History   Problem Relation Age of Onset     Cancer Father      KIDNEY     Hypertension Mother      Cancer Brother      PROSTATE       Current Outpatient Prescriptions   Medication Sig Dispense Refill     acetaminophen-codeine (TYLENOL #3) 300-30 mg per tablet Take 1-2 tablets by mouth every 4 (four) hours as needed for pain. 60 tablet 0     clotrimazole (LOTRIMIN) 1 % cream Apply topically 2 (two) times a day.       eszopiclone (LUNESTA) tablet Take 3 mg by mouth bedtime. Take immediately before bedtime        "fluocinonide (LIDEX) 0.05 % cream Apply topically 2 (two) times a day.       mometasone (NASONEX) 50 mcg/actuation nasal spray 2 sprays into each nostril daily.       pravastatin (PRAVACHOL) 80 MG tablet Take 1 tablet (80 mg total) by mouth at bedtime. 90 tablet 3     propranolol (INDERAL LA) 60 mg 24 hr capsule 1 tab twice daily 180 capsule 3     SUMAtriptan (IMITREX) 6 mg/0.5 mL Soln Inject 0.5 mL (6 mg total) under the skin once for 1 dose. 0.5 mL 4     tamsulosin (FLOMAX) 0.4 mg Cp24 Take 1 capsule (0.4 mg total) by mouth daily. 90 capsule 3     triazolam (HALCION) 0.25 MG tablet Take 0.25 mg by mouth bedtime as needed.       No current facility-administered medications for this visit.        No Known Allergies    Physical Exam:    General Appearance:   Well-developed, well-nourished, and oriented female in no acute distress.  /68 (Patient Site: Left Arm, Patient Position: Sitting, Cuff Size: Adult Regular)  Pulse 66  Ht 5' 11.5\" (1.816 m)  Wt 197 lb (89.4 kg)  BMI 27.09 kg/m2    EYES: Eyelids, conjunctiva, and sclera were normal. Pupils were normal.  HEAD, EARS, NOSE, MOUTH, AND THROAT: Head and face were normal. Hearing was normal to voice and the ears were normal to exam. Nose appearance was normal and there was no discharge. Oropharynx was normal.  NECK: Neck appearance was normal. There was no cervical adenopathy.  RESPIRATORY: Breathing pattern was normal and the chest moved symmetrically.  Lung sounds were normal and there were no abnormal sounds.  CARDIOVASCULAR: Heart rate and rhythm were normal.  S1 and S2 were normal and there were no extra sounds or murmurs. Peripheral pulses in arms and legs were normal.    GASTROINTESTINAL: The abdomen was normal in contour.  Bowel sounds were present.  No hepatomegaly or splenomegaly. No localized tenderness.  MUSCULOSKELETAL: Skeletal configuration was normal and muscle mass was normal for age. Joint appearance was overall normal.    SKIN/HAIR/NAILS: " Skin color was normal.  There were no skin lesions.  Hair and nails were normal.  EXTREMITIES: No peripheral edema. DP/PT pulses were normal.  NEUROLOGIC: The patient was alert and oriented to person, place, time, and circumstance. Speech was normal. Cranial nerves were normal. Motor strength was normal for age. The patient was normally coordinated.  PSYCHIATRIC:  Mood and affect were normal and the patient had normal recent and remote memory. The patient's judgment and insight were normal.            Evaristo Ledezma MD  Certificate in Travel [facility]   Internal Medicine & Travel Medicine  [address] [address]  Voice: [phone]  Fax: [phone]

## 2021-06-15 NOTE — PROGRESS NOTES
"[facility] Clinic Note    Leonidas Crum   62 y.o. male    Date of Visit: 1/3/2018  Chief Complaint   Patient presents with     Establish Care       ASSESSMENT/PLAN  1. Health maintenance examination  Comprehensive Metabolic Panel    HM2(CBC w/o Differential)    Lipid Cascade   2. Family history of prostate cancer  PSA (Prostatic-Specific Antigen), Annual Screen   3. Fatigue, unspecified type  Thyroid Stimulating Hormone (TSH)     ---------------------------------------------    1.  Check fasting labs today, overall seems to be a very healthy man.  He had some nonspecific sharp intermittent pain on the left chest wall, which I would attribute to musculoskeletal pain.  I did ask him to let me know if the character of the pain changes in any way, which might prompt further workup.    2.  Due to his brother having prostate cancer, will continue PSA and rectal examinations annually    3.  He is fatigued and often takes a nap during the day, was tested for sleep apnea 15 years ago and was negative.  He does get poor quality of sleep or at least poor quantity of sleep.  He will consider sleep evaluation again.  He takes sleeping medications very sporadically.  I emphasized the importance of not taking these on a regular basis.  Weight loss may also help with his energy as he plans to do prior to his daughter's wedding.    Return for Annual physical.      SUBJECTIVE  Leonidas Crum \"Mario\" is a 62-year-old man who presents for annual physical.    He was a long-time patient of Dr. Evaristo Ledezma.    As far as medical history, he has history of histoplasmosis, mild depression, migraine headaches, and has been through many different treatments.  Propranolol seems to work the best.  He has Tylenol 3 which he takes very infrequently.  He also has insomnia, takes triazolam or Lunesta very rarely.  He does not sleep well the next night after taking 1 of these, so he tends to avoid them.  In the warm months, he " takes Nasonex for nasal allergies.    He has history of TURP and BPH, he still has lower urinary tract symptoms.  Of note, his brother has prostate cancer, and he gets annual PSA and rectal exam.    He is retired as of one year ago, children and remodeling keep him busy.  His daughter is getting  this June and he would like to lose 20 pounds for that.  He is a former smoker, quit in 1985, is up-to-date on colon cancer screening.  He drinks about 1 alcoholic beverage per week.    He has noticed left posterior/lateral chest wall pain which is brief and sharp at night sometimes.  It feels deeper.  He does not have any other symptoms associated with that.    ROS A comprehensive review of systems was performed and was otherwise negative    Medications, allergies, and problem list were reviewed and updated    Patient Active Problem List   Diagnosis     Allergic Rhinitis     Hyperlipidemia     Chronic Reflux Esophagitis     Anxiety     Dysthymic Disorder     Migraine Headache     Insomnia     Family history of prostate cancer     Past Medical History:   Diagnosis Date     Fatigue     Created by Conversion      Histoplasmosis      Hyperplastic polyp of large intestine 8/14/2017     Migraine      Nephrolithiasis     Created by Conversion      Raynaud's Phenomenon     Created by Conversion      Past Surgical History:   Procedure Laterality Date     HALLUX RIGIDUS       MO APPENDECTOMY      Description: Appendectomy;  Recorded: 06/11/2008;     MO [facility] INCL FLUOR GDNCE DX W/CELL WASHG SPX      Description: Lung Surgery Bronchoscopy;  Recorded: 03/19/2008;     MO CYSTOSCOPY,MANIPULATN      Description: Cystoscopy With Manipulation Of Ureteral Calculus;  Recorded: 12/26/2008;     MO CYSTOURETHROSCOPY      Description: Cystoscopy (For Therapy);  Recorded: 02/28/2011;  Comments: Ureteric stone, lithotripsy.     MO KNEE SCOPE,DIAGNOSTIC      Description: Arthroscopy Knee Left;  Recorded: 12/26/2008;     MO KNEE  SCOPE,DIAGNOSTIC      Description: Arthroscopy Knee Right;  Recorded: 04/12/2011;     SD MEDIASTINOSCOPY      Description: Mediastinoscopy;  Recorded: 03/19/2008;     SD TRANSURETHRAL ELEC-SURG PROSTATECTOM      Description: Transurethral Resection Of Prostate (TURP);  Recorded: 11/06/2009;  Comments: LASER SURGERY     Social History     Social History     Marital status:      Spouse name: N/A     Number of children: N/A     Years of education: N/A     Occupational History     Not on file.     Social History Main Topics     Smoking status: Former Smoker     Quit date: 9/21/1985     Smokeless tobacco: Never Used     Alcohol use Yes      Comment: 1/wk     Drug use: Not on file     Sexual activity: Not on file     Other Topics Concern     Not on file     Social History Narrative     Family History   Problem Relation Age of Onset     Cancer Father      KIDNEY     Hypertension Mother      Cancer Brother      PROSTATE       Current Outpatient Prescriptions   Medication Sig Dispense Refill     acetaminophen-codeine (TYLENOL #3) 300-30 mg per tablet Take 1-2 tablets by mouth every 4 (four) hours as needed for pain. 60 tablet 0     clotrimazole (LOTRIMIN) 1 % cream Apply topically 2 (two) times a day.       eszopiclone (LUNESTA) tablet Take 3 mg by mouth bedtime. Take immediately before bedtime       fluocinonide (LIDEX) 0.05 % cream Apply topically 2 (two) times a day.       mometasone (NASONEX) 50 mcg/actuation nasal spray 2 sprays into each nostril daily.       pravastatin (PRAVACHOL) 80 MG tablet Take 1 tablet (80 mg total) by mouth at bedtime. 90 tablet 3     propranolol (INDERAL LA) 60 mg 24 hr capsule 1 tab twice daily 180 capsule 3     tamsulosin (FLOMAX) 0.4 mg Cp24 Take 1 capsule (0.4 mg total) by mouth daily. 90 capsule 3     triazolam (HALCION) 0.25 MG tablet Take 0.25 mg by mouth bedtime as needed.       No current facility-administered medications for this visit.        No Known  "Allergies    EXAM  Vitals:    01/03/18 0919   BP: 104/68   Patient Site: Left Arm   Patient Position: Sitting   Cuff Size: Adult Regular   Pulse: 64   SpO2: 99%   Weight: 203 lb (92.1 kg)   Height: 5' 11.65\" (1.82 m)         General: alert, no distress  HEENT: sclerae anicteric, moist oral mucosa  Heart: Regular rate and rhythm, no murmurs.  No pretibial edema.  Warm extremities  Lungs: Clear to auscultation bilat  Gastrointestinal: abdomen is soft, non-tender, non-distended.    Skin: warm/dry, no rashes  Neuro: no gross abnormalities  Rectal: Prostate normal in size and contour, no nodules, no rectal polyps or perianal lesions    Edgar Brice, DO  Internal Medicine  [facility]  "

## 2021-06-16 PROBLEM — Z80.42 FAMILY HISTORY OF PROSTATE CANCER: Status: ACTIVE | Noted: 2018-01-03

## 2021-06-16 PROBLEM — N28.1 ACQUIRED RENAL CYST OF RIGHT KIDNEY: Status: ACTIVE | Noted: 2019-01-16

## 2021-06-16 NOTE — TELEPHONE ENCOUNTER
Telephone Encounter by Cassidy Wagoner LPN at 9/20/2019 12:16 PM     Author: Cassidy Wagoner LPN Service: -- Author Type: Licensed Nurse    Filed: 9/20/2019 12:22 PM Encounter Date: 9/20/2019 Status: Signed    : Cassidy Wagoner LPN (Licensed Nurse)       Per chart and  review, Temazepam has never been prescribed by Dr. Brice nor prescribed in the past year. Contacted pt to get more information.  Pt reported that the Triazolam is not covered by his insurance, but was told Temazepam was covered.

## 2021-06-18 NOTE — LETTER
Letter by Edgar Brice DO at      Author: Edgar Brice DO Service: -- Author Type: --    Filed:  Encounter Date: 1/11/2019 Status: (Other)         [facility] INTERNAL MEDICINE  01/11/19    Patient: Leonidas Crum  YOB: 1955  Medical Record Number: [id number]                                                                  Opioid / Opioid Plus Controlled Substance Agreement    I understand that my care provider has prescribed an opioid (narcotic) controlled substance to help manage my condition(s). I am taking this medicine to help me function or work. I know this is strong medicine, and that it can cause serious side effects. Opioid medicine can be sedating, addicting and may cause a dependency on the drug. They can affect my ability to drive or think, and cause depression. They need to be taken exactly as prescribed. Combining opioids with certain medicines or chemicals (such as cocaine, sedatives and tranquilizers, sleeping pills, meth) can be dangerous or even fatal. Also, if I stop opioids suddenly, I may have severe withdrawal symptoms. Last, I understand that opioids do not work for all types of pain nor for all patients. If not helpful, I may be asked to stop them.    I am also being prescribed a benzodiazepine (tranquilizer) controlled substance.   I understand this type of medication is sedating, and can increase the risk of death when taken together with opioids.  I have talked to my care team about the option of having a prescription for Narcan to use to reverse the opioid medicine, in case I get too sleepy. I will be very careful to take my medicines only as directed.      The risks, benefits, and side effects of these medicine(s) were explained to me. I agree that:    1. I will take part in other treatments as advised by my care team. This may be psychiatry or counseling, physical therapy, behavioral therapy, group treatment or a referral to a pain clinic. I will reduce or  stop my medicine when my care team tells me to do so.  2. I will take my medicines as prescribed. I will not change the dose or schedule unless my care team tells me to. There will be no refills if I run out early.  I may be contactedwithout warning and asked to complete a urine drug test or pill count at any time.   3. I will keep all my appointments, and understand this is part of the monitoring of opioids. My care team may require an office visit for EVERY opioid/controlled substance refill. If I miss appointments or dont follow instructions, my care team may stop my medicine.  4. I will not ask other providers to prescribe controlled substances, and I will not accept controlled substances from other people. If I need another prescribed controlled substance for a new reason, I will tell my care team within 1 business day.  5. I will use one pharmacy to fill all of my controlled substance prescriptions, and it is up to me to make sure that I do not run out of my medicines on weekends or holidays. If my care team is willing to refill my opioid prescription without a visit, I must request refills only during office hours, refills may take up to 3 days to process, and it may take up to 5 to 7 days for my medicine to be mailed and ready at my pharmacy. Prescriptions will not be mailed anywhere except my pharmacy.        [id number]  Rev 12/18         Registration to scan to EHR                             Page 1 of 2               Controlled Substance Agreement Opioid        [facility]  01/11/19  Patient: Leonidas Crum  YOB: 1955  Medical Record Number: [id number]                                                                  6. I am responsible for my prescriptions. If the medicine/prescription is lost or stolen, it will not be replaced. I also agree not to share controlled substance medicines with anyone.  7. I agree to not use ANY illegal or recreational drugs. This includes marijuana,  cocaine, bath salts or other drugs. I agree not to use alcohol unless my care team says I may.          I agree to give urine samples whenever asked. If I dont give a urine sample, the care team may stop my medicine.    8. If I enroll in the [place] Medical Marijuana program, I will tell my care team. I will also sign an agreement to share my medical records with my care team.   9. I will bring in my list of medicines (or my medicine bottles) each time I come to the clinic.   10. I will tell my care team right away if I become pregnant or have a new medical problem treated outside of my regular clinic.  11. I understand that this medicine can affect my thinking and judgment. It may be unsafe for me to drive, use machinery and do dangerous tasks. I will not do any of these things until I know how the medicine affects me. If my dose changes, I will wait to see how it affects me. I will contact my care team if I have concerns about medicine side effects.    I understand that if I do not follow any of the conditions above, my prescriptions or treatment may be stopped.      I agree that my provider, clinic care team, and pharmacy may work with any city, state or federal law enforcement agency that investigates the misuse, sale, or other diversion of my controlled medicine. I will allow my provider to discuss my care with or share a copy of this agreement with any other treating provider, pharmacy or emergency room where I receive care. I agree to give up (waive) any right of privacy or confidentiality with respect to these consents.     I have read this agreement and have asked questions about anything I did not understand.      ________________________________________________________________________  Patient signature - Date/Time -  Leonidas Crum                                      ________________________________________________________________________  Witness signature                                                             ________________________________________________________________________  Provider signature - Edgar Carloz DO Yuniel      [id number]  Rev 12/18         Registration to scan to EHR                         Page 2 of 2                   Controlled Substance Agreement Opioid           Page 1 of 2  Opioid Pain Medicines (also known as Narcotics)  What You Need to Know    What are opioids?   Opioids are pain medicines that must be prescribed by a doctor.  They are also known as narcotics.    Examples are:     morphine (MS Contin, Bertha)    oxycodone (Oxycontin)    oxycodone and acetaminophen (Percocet)    hydrocodone and acetaminophen (Vicodin, Norco)     fentanyl patch (Duragesic)     hydromorphone (Dilaudid)     methadone     What do opioids do well?   Opioids are best for short-term pain after a surgery or injury. They also work well for cancer pain. Unlike other pain medicines, they do not cause liver or kidney failure or ulcers. They may help some people with long-lasting (chronic) pain.     What do opioids NOT do well?   Opioids never get rid of pain entirely, and they do not work well for most patients with chronic pain. Opioids do not reduce swelling, one of the causes of pain. They also dont work well for nerve pain.                           For informational purposes only.  Not to replace the advice of your care provider.  Copyright [facility]. All right reserved. [employer] 415231-[id number] 02/18.      Page 2 of 2    Risks and side effects   Talk to your doctor before you start or decide to keep taking one of these medicines. Side effects include:    Lowering your breathing rate enough to cause death    Overdose, including death, especially if taking higher than prescribed doses    Long-term opioid use    Worse depression symptoms; less pleasure in things you usually enjoy    Feeling tired or sluggish    Slower thoughts or cloudy thinking    Being more sensitive to pain over time;  pain is harder to control    Trouble sleeping or restless sleep    Changes in hormone levels (for example, less testosterone)    Changes in sex drive or ability to have sex    Constipation    Unsafe driving    Itching and sweating    Feeling dizzy    Nausea, vomiting and dry mouth    What else should I know about opioids?  When someone takes opioids for too long or too often, they become dependent. This means that if you stop or reduce the medicine too quickly, you will have withdrawal symptoms.    Dependence is not the same as addiction. Addiction is when people keep using a substance that harms their body, their mind or their relations with others. If you have a history of drug or alcohol abuse, taking opioids can cause a relapse.    Over time, opioids dont work as well. Most people will need higher and higher doses. The higher the dose, the more serious the side effects. We dont know the long-term effects of opioids.      Prescribed opioids aren't the best way to manage chronic pain    Other ways to manage pain include:      Ibuprofen or acetaminophen.  You should always try this first.      Treat health problems that may be causing pain.      acupuncture or massage, deep breathing, meditation, visual imagery, aromatherapy.      Use heat or ice at the pain site      Physical therapy and exercise      Stop smoking      See a counselor or therapist                                                  People who have used opioids for a long time may have a lower quality of life, worse depression, higher levels of pain and more visits to doctors.    Never share your opioids with others. Be sure to store opioids in a secure place, locked if possible.Young children can easily swallow them and overdose.     You can overdose on opioids.  Signs of overdose include decrease or loss of consciousness, slowed breathing, trouble waking and blue lips.  If someone is worried about overdose, they should call 911.    If you are at  risk for overdose, you may get naloxone (Narcan, a medicine that reverses the effects of opioids.  If you overdose, a friend or family member can give you Narcan while waiting for the ambulance.  They need to know the signs of overdose and how to give Narcan.    While you're taking opioids:    Don't use alcohol or street drugs. Taking them together can cause death.    Don't take any of these medicines unless your doctor says its okay.  Taking these with opioids can cause death.    Benzodiazepines (such as lorazepam         or diazepam)    Muscle relaxers (such as cyclobenzaprine)    sleeping pills    other opioids    Safe disposal of opioids  Find your area drug take-back program, your pharmacy mail-back program, buy a special disposal bag (such as Deterra) from your pharmacy or flush them down the toilet.  Use the guidelines at:  www.fda.gov/drugs/resourcesforyou

## 2021-06-21 NOTE — PROGRESS NOTES
[facility] Clinic Note    Leonidas Crum   63 y.o. male    Date of Visit: 10/19/2018  Chief Complaint   Patient presents with     Follow-up     Medication Management     Migraine       ASSESSMENT/PLAN  1. Other migraine without status migrainosus, not intractable  erenumab-aooe (AIMOVIG AUTOINJECTOR) 70 mg/mL atIn   2. Raynaud's disease without gangrene       ---------------------------------------------    1.  Leonidas is a 63-year-old man with history of chronic recurrent migraines who has been through numerous different prophylactic treatments, would like to try some of the newer CGRP antagonist treatment options.  We decided on Aimovig to begin with, Emgality as a backup.  Once he starts this 70 mg injection every 28 days, he may discontinue the propranolol, which seems to cause Raynaud's.  There is an option of a 140 mg dose every 28 days with this medication, though we will start the lower dose first.  I confirmed with the arm assist on potential side effects, which seemed to be mainly limited to his constipation and local reaction.  He is not at a high risk of cardiovascular disease, as there is some theoretical risk of association with heart disease.    2.  Seems to be not an issue once he is off of propranolol.  Could consider nifedipine or amlodipine as treatment for Raynaud's if this continues to be an issue.    Return in about 6 months (around 4/19/2019) for Next scheduled follow up.      SUBJECTIVE  Leonidas Crum is a 63-year-old man who presents for follow-up on migraines.    She has been on numerous different medications for migraine prophylaxis as well as abortive treatments.  Some of the prophylactic treatments he has been on include vitamins (unspecified), Depakote, Atacand, verapamil, Topamax (recalls having a lot of side effects with Topamax).  Current prophylaxis is propranolol, which is problematic in the winter because it makes the Raynaud's difficult to manage.  When he is  off of propranolol, he has no issues with this.  He uses Imitrex, Advil, and Tylenol No. 3 for abortive treatment.  He is averaging 2 migraines a month, well off prophylactic medications, he will get about 4 migraines per month, he will have 6-8 other non-migraine headaches in addition to it when he is off of prophylaxis.  He used to see Dr. Olmos at the [facility].    He saw some advertisement about calcitonin gene-related peptide (CGRP) antagonists including Ajovy (injection), Aimovig (pen) and Emgality (pen).  He would prefer not to do the syringe injection.  He has some coupons that could get him a free year supply.  These are for migraine prophylaxis.  We reviewed potential side effects including constipation and local site reaction.      ROS:   Per HPI, all other systems negative     Medications, allergies, and problem list were reviewed and updated    Patient Active Problem List   Diagnosis     Allergic Rhinitis     Hyperlipidemia     Chronic Reflux Esophagitis     Anxiety     Dysthymic Disorder     Migraine Headache     Raynaud's disease without gangrene     Insomnia     Family history of prostate cancer     Past Medical History:   Diagnosis Date     Fatigue     Created by Conversion      Histoplasmosis      Hyperplastic polyp of large intestine 8/14/2017     Migraine      Nephrolithiasis     Created by Conversion      Raynaud's Phenomenon     Created by Conversion      Current Outpatient Prescriptions   Medication Sig Dispense Refill     acetaminophen-codeine (TYLENOL #3) 300-30 mg per tablet Take 1-2 tablets by mouth every 4 (four) hours as needed for pain. 60 tablet 0     clotrimazole (LOTRIMIN) 1 % cream Apply topically 2 (two) times a day.       eszopiclone (LUNESTA) tablet Take 3 mg by mouth bedtime. Take immediately before bedtime       fluocinonide (LIDEX) 0.05 % cream Apply topically 2 (two) times a day.       mometasone (NASONEX) 50 mcg/actuation nasal spray 2 sprays into each  "nostril daily.       pravastatin (PRAVACHOL) 80 MG tablet Take 1 tablet (80 mg total) by mouth at bedtime. 90 tablet 3     tamsulosin (FLOMAX) 0.4 mg Cp24 Take 1 capsule (0.4 mg total) by mouth daily. 90 capsule 3     triazolam (HALCION) 0.25 MG tablet Take 0.25 mg by mouth bedtime as needed.       erenumab-aooe (AIMOVIG AUTOINJECTOR) 70 mg/mL atIn Inject 70 mg under the skin every 28 days. 1 mL 11     No current facility-administered medications for this visit.      No Known Allergies    EXAM  Vitals:    10/19/18 1149   BP: 118/80   Patient Site: Left Arm   Patient Position: Sitting   Cuff Size: Adult Regular   Pulse: 60   SpO2: 99%   Weight: 183 lb 1.6 oz (83.1 kg)   Height: 5' 11.65\" (1.82 m)         General: Alert, pleasant, no distress  Psychiatric: Pleasant affect    Results reviewed: Care everywhere accessed, consulted with pharmacist on side effects of these medications, reviewed old neurology notes from care everywhere, summarized above.    Data points: 3    Edgar Brice DO  Internal Medicine  [facility]    "

## 2021-06-23 NOTE — PROGRESS NOTES
[facility] Note    Leonidas Crum   63 y.o. male    Date of Visit: 1/11/2019  Chief Complaint   Patient presents with     Annual Exam       ASSESSMENT/PLAN  1. Health maintenance examination  Lipid Cascade    Basic Metabolic Panel    Hepatic Profile    HM2(CBC w/o Differential)   2. Need for shingles vaccine  Varicella Zoster, Recombinant Vaccine IM    Varicella Zoster, Recombinant Vaccine IM   3. Flank pain  CT Abdomen Pelvis Without Oral Without IV Contrast   4. Controlled substance agreement signed  Drugs of Abuse 1,Urine   5. Prostate cancer screening  PSA (Prostatic-Specific Antigen), Annual Screen     ---------------------------------------------  1. Overall very healthy, BMI at upper limit of normal, but is active.  Check fasting labs    2. Shingles shot given    3. Flank pain, uncertain origin.  Sometimes bothering him at night.  Nephrolithiasis is on ddx, check CT stone protocol.  Low probability RCC, but study would show this if present as well     4. CSA updated for tylenol #3-- not taking as much any longer due to less migraines with Aimovig    5. Continue PSA and DAVID annually      Return in about 1 year (around 1/11/2020) for Annual physical.      SUBJECTIVE  Leonidas Crum is here for the annual physical.      After being on numerous other prophylactic medications for migraines, he was started on Aimovig, which was finally approved for 6 months after some paperwork and having to see neurology.  The first two months he had no migraines.  He has had only 5 minor headahces since then, it is working well.  This is a definite decrease in his headache frequency.      He has noticed left posterior/lateral chest wall pain which is brief and sharp at night sometimes.  It feels deeper.  He does not have any other symptoms associated with that.  We talked about this before.  He had concerns that it could be kidney cancer (his father had this) or kidney stones, which he has had before.         ROS A comprehensive review of systems was performed and was otherwise negative    Medications, allergies, and problem list were reviewed and updated    Patient Active Problem List   Diagnosis     Allergic Rhinitis     Hyperlipidemia     Chronic Reflux Esophagitis     Anxiety     Dysthymic Disorder     Migraine Headache     Raynaud's disease without gangrene     Insomnia     Family history of prostate cancer     Past Medical History:   Diagnosis Date     Fatigue     Created by Conversion      Histoplasmosis      Hyperplastic polyp of large intestine 8/14/2017     Migraine      Nephrolithiasis     Created by Conversion      Raynaud's Phenomenon     Created by Conversion      Past Surgical History:   Procedure Laterality Date     HALLUX RIGIDUS       DC APPENDECTOMY      Description: Appendectomy;  Recorded: 06/11/2008;     DC BRNCHSC INCL FLUOR GDNCE DX W/CELL WASHG SPX      Description: Lung Surgery Bronchoscopy;  Recorded: 03/19/2008;     DC CYSTOSCOPY,MANIPULATN      Description: Cystoscopy With Manipulation Of Ureteral Calculus;  Recorded: 12/26/2008;     DC CYSTOURETHROSCOPY      Description: Cystoscopy (For Therapy);  Recorded: 02/28/2011;  Comments: Ureteric stone, lithotripsy.     DC KNEE SCOPE,DIAGNOSTIC      Description: Arthroscopy Knee Left;  Recorded: 12/26/2008;     DC KNEE SCOPE,DIAGNOSTIC      Description: Arthroscopy Knee Right;  Recorded: 04/12/2011;     DC MEDIASTINOSCOPY      Description: Mediastinoscopy;  Recorded: 03/19/2008;     DC TRANSURETHRAL ELEC-SURG PROSTATECTOM      Description: Transurethral Resection Of Prostate (TURP);  Recorded: 11/06/2009;  Comments: LASER SURGERY     Social History     Socioeconomic History     Marital status:      Spouse name: Not on file     Number of children: Not on file     Years of education: Not on file     Highest education level: Not on file   Social Needs     Financial resource strain: Not on file     Food insecurity - worry: Not on file      "Food insecurity - inability: Not on file     Transportation needs - medical: Not on file     Transportation needs - non-medical: Not on file   Occupational History     Not on file   Tobacco Use     Smoking status: Former Smoker     Last attempt to quit: 1985     Years since quittin.3     Smokeless tobacco: Never Used   Substance and Sexual Activity     Alcohol use: Yes     Comment: 1/wk     Drug use: Not on file     Sexual activity: Not on file   Other Topics Concern     Not on file   Social History Narrative     Not on file     Family History   Problem Relation Age of Onset     Cancer Father         KIDNEY     Hypertension Mother      Cancer Brother         PROSTATE       Current Outpatient Medications   Medication Sig Dispense Refill     acetaminophen-codeine (TYLENOL #3) 300-30 mg per tablet Take 1-2 tablets by mouth every 4 (four) hours as needed for pain. 60 tablet 0     clotrimazole (LOTRIMIN) 1 % cream Apply topically 2 (two) times a day.       erenumab-aooe (AIMOVIG AUTOINJECTOR) 70 mg/mL atIn Inject 70 mg under the skin every 28 days. 1 mL 11     eszopiclone (LUNESTA) tablet Take 3 mg by mouth at bedtime as needed. Take immediately before bedtime              fluocinonide (LIDEX) 0.05 % cream Apply topically 2 (two) times a day as needed.              mometasone (NASONEX) 50 mcg/actuation nasal spray 2 sprays into each nostril daily as needed.              triazolam (HALCION) 0.25 MG tablet Take 0.25 mg by mouth bedtime as needed.       pravastatin (PRAVACHOL) 80 MG tablet Take 1 tablet (80 mg total) by mouth at bedtime. 90 tablet 3     No current facility-administered medications for this visit.        No Known Allergies    EXAM  Vitals:    19 1116   BP: 116/64   Patient Site: Left Arm   Patient Position: Sitting   Cuff Size: Adult Regular   Pulse: 70   SpO2: 99%   Weight: 185 lb (83.9 kg)   Height: 5' 11.5\" (1.816 m)         General: alert, no distress  HEENT: sclerae anicteric, moist oral " mucosa  Heart: Regular rate and rhythm, no murmurs.  No pretibial edema.  Warm extremities  Lungs: Clear to auscultation bilat  Gastrointestinal: abdomen is soft, non-tender, non-distended.    Skin: warm/dry, no rashes  Neuro: no gross abnormalities  RECTAL: prostate firm, smooth, size normal, no nodules      RESULTS REVIEWED:     Ct Abdomen Pelvis Without Oral Without Iv Contrast    Result Date: 1/14/2019  CT ABDOMEN PELVIS WO ORAL WO IV CONTRAST 1/14/2019 11:15 AM INDICATION: Intermittent left flank pain. TECHNIQUE: Routine CT abdomen and pelvis without oral or IV contrast. Multiplanar reformation images (MPR). Dose reduction techniques were used. COMPARISON: 09/13/2007. FINDINGS: LUNG BASES: Benign stable 7 x 5 mm subpleural nodule right lower lobe (image 5 of series 3) requires no further follow-up. ABDOMEN: 1 mm nonobstructing stone mid to lower right kidney posterolaterally. 5 cm cyst upper pole right kidney has increased in size from 2 cm. Liver, spleen, pancreas, kidneys, adrenal glands, gallbladder, bile ducts, and abdominal aorta are otherwise  negative. PELVIS: Appendectomy. Diffuse symmetric lipomatosis of the perinephric fat in the abdomen and upper pelvis causes anterior displacement of the normal-appearing ascending and descending portions of the colon. Pelvic lipomatosis surrounding the rectum is not present. Colonic diverticulosis. Colon, small intestine, appendix, ureters and bladder otherwise negative. MUSCULOSKELETAL: Negative.     CONCLUSION: 1.  Small nonobstructing stone right kidney. 2.  Probably benign right renal cyst has increased in size. Recommend ultrasound to confirm benign nature. 3.  Diffuse symmetric lipomatosis of the perinephric fat in the abdomen and upper pelvis causes some anterior displacement of the normal-appearing ascending and descending portions of the colon again seen anterior abdominal wall. Finding is of uncertain clinical significance.       Recent Results (from the  past 240 hour(s))   Drugs of Abuse 1,Urine   Result Value Ref Range    Amphetamines Screen Negative Screen Negative    Benzodiazepines Screen Negative Screen Negative    Opiates Screen Negative Screen Negative    Phencyclidine Screen Negative Screen Negative    THC Screen Negative Screen Negative    Barbiturates Screen Negative Screen Negative    Cocaine Metabolite Screen Negative Screen Negative    Oxycodone Screen Negative Screen Negative    Creatinine, Urine 82.5 mg/dL   Lipid Cascade   Result Value Ref Range    Cholesterol 172 <=199 mg/dL    Triglycerides 80 <=149 mg/dL    HDL Cholesterol 50 >=40 mg/dL    LDL Calculated 106 <=129 mg/dL    Patient Fasting > 8hrs? Yes    Basic Metabolic Panel   Result Value Ref Range    Sodium 139 136 - 145 mmol/L    Potassium 4.5 3.5 - 5.0 mmol/L    Chloride 105 98 - 107 mmol/L    CO2 23 22 - 31 mmol/L    Anion Gap, Calculation 11 5 - 18 mmol/L    Glucose 88 70 - 125 mg/dL    Calcium 9.7 8.5 - 10.5 mg/dL    BUN 17 8 - 22 mg/dL    Creatinine 0.82 0.70 - 1.30 mg/dL    GFR MDRD Af Amer >60 >60 mL/min/1.73m2    GFR MDRD Non Af Amer >60 >60 mL/min/1.73m2   Hepatic Profile   Result Value Ref Range    Bilirubin, Total 0.8 0.0 - 1.0 mg/dL    Bilirubin, Direct 0.2 <=0.5 mg/dL    Protein, Total 6.9 6.0 - 8.0 g/dL    Albumin 4.1 3.5 - 5.0 g/dL    Alkaline Phosphatase 85 45 - 120 U/L    AST 26 0 - 40 U/L    ALT 27 0 - 45 U/L   HM2(CBC w/o Differential)   Result Value Ref Range    WBC 6.5 4.0 - 11.0 thou/uL    RBC 5.00 4.40 - 6.20 mill/uL    Hemoglobin 14.6 14.0 - 18.0 g/dL    Hematocrit 43.7 40.0 - 54.0 %    MCV 87 80 - 100 fL    MCH 29.2 27.0 - 34.0 pg    MCHC 33.4 32.0 - 36.0 g/dL    RDW 11.0 11.0 - 14.5 %    Platelets 217 140 - 440 thou/uL    MPV 8.7 7.0 - 10.0 fL   PSA (Prostatic-Specific Antigen), Annual Screen   Result Value Ref Range    PSA 0.8 0.0 - 4.5 ng/mL       Edgar Brice DO  Internal Medicine  [facility] [[email or web address]]

## 2021-07-03 NOTE — ADDENDUM NOTE
Addendum Note by Wilber Mendoza LPN at 12/6/2018  3:45 PM     Author: Wilber Mednoza LPN Service: -- Author Type: Licensed Nurse    Filed: 12/6/2018  3:45 PM Encounter Date: 10/25/2018 Status: Signed    : Wilber Mendoza LPN (Licensed Nurse)    Addended by: WILBER MENDOZA on: 12/6/2018 03:45 PM        Modules accepted: Orders

## 2021-09-11 ENCOUNTER — HEALTH MAINTENANCE LETTER (OUTPATIENT)
Age: 66
End: 2021-09-11

## 2022-10-30 ENCOUNTER — HEALTH MAINTENANCE LETTER (OUTPATIENT)
Age: 67
End: 2022-10-30

## 2023-11-12 ENCOUNTER — HEALTH MAINTENANCE LETTER (OUTPATIENT)
Age: 68
End: 2023-11-12

## 2024-11-11 ENCOUNTER — TRANSFERRED RECORDS (OUTPATIENT)
Dept: HEALTH INFORMATION MANAGEMENT | Facility: CLINIC | Age: 69
End: 2024-11-11
Payer: COMMERCIAL

## 2025-06-09 ENCOUNTER — TRANSFERRED RECORDS (OUTPATIENT)
Dept: HEALTH INFORMATION MANAGEMENT | Facility: CLINIC | Age: 70
End: 2025-06-09
Payer: COMMERCIAL

## 2025-06-17 ENCOUNTER — TRANSFERRED RECORDS (OUTPATIENT)
Dept: HEALTH INFORMATION MANAGEMENT | Facility: CLINIC | Age: 70
End: 2025-06-17
Payer: COMMERCIAL